# Patient Record
Sex: FEMALE | URBAN - METROPOLITAN AREA
[De-identification: names, ages, dates, MRNs, and addresses within clinical notes are randomized per-mention and may not be internally consistent; named-entity substitution may affect disease eponyms.]

---

## 2022-02-10 ENCOUNTER — APPOINTMENT (RX ONLY)
Dept: URBAN - METROPOLITAN AREA CLINIC 154 | Facility: CLINIC | Age: 32
Setting detail: DERMATOLOGY
End: 2022-02-10

## 2022-02-10 DIAGNOSIS — Z41.9 ENCOUNTER FOR PROCEDURE FOR PURPOSES OTHER THAN REMEDYING HEALTH STATE, UNSPECIFIED: ICD-10-CM

## 2022-02-10 PROCEDURE — ? KYBELLA INJECTION

## 2022-02-10 PROCEDURE — ? BOTOX

## 2022-02-10 NOTE — PROCEDURE: BOTOX
Show Ucl Units: No
Post-Care Instructions: Patient instructed to not lie down for 4 hours and limit physical activity for 24 hours.
Inferior Lateral Orbicularis Oculi Units: 14
Show Depressor Anguli Units: Yes
Periorbital Skin Units: 0
Dilution (U/0.1 Cc): 4
Forehead Units: 6
Consent: Written consent obtained. Risks include but not limited to lid/brow ptosis, bruising, swelling, diplopia, temporary effect, incomplete chemical denervation.
Expiration Date (Month Year): 06/23
Additional Area 1 Location: chin
Glabellar Complex Units: 15
Detail Level: Detailed
Lot #: [id number]

## 2022-02-10 NOTE — PROCEDURE: KYBELLA INJECTION
Treatment Area: Submental Chin
Treatment Number (Won't Render If 0): 3
Procedure Text: The treatment areas were cleansed. Kybella was administered using the parameters mentioned above.
Consent: Written consent obtained. Risks include but not limited to bruising, beading, irregular texture, ulceration, infection, allergic reaction, scar formation, incomplete augmentation, temporary nature, procedural pain.
Post-Care Instructions: Patient instructed to apply ice to reduce swelling.
Anesthesia Volume In Cc: 0.5
Detail Level: Detailed
Number Of Grid Dots (Won't Render If 0): 0
Kybella Volume In Cc (Won't Render If 0): 2

## 2023-02-03 ENCOUNTER — NURSE ONLY (OUTPATIENT)
Dept: LAB | Age: 33
End: 2023-02-03

## 2024-12-18 ENCOUNTER — LAB (OUTPATIENT)
Dept: LAB | Age: 34
End: 2024-12-18

## 2024-12-20 LAB
C. TRACHOMATIS DNA,THIN PREP: NEGATIVE
N. GONORRHOEAE DNA, THIN PREP: NEGATIVE
SOURCE: NORMAL
SOURCE: NORMAL
TRICHOMONAS VAGINALI, MOLECULAR: NEGATIVE

## 2025-01-08 LAB — CYTOLOGY THIN PREP PAP: NORMAL
